# Patient Record
Sex: MALE | Race: WHITE | NOT HISPANIC OR LATINO | Employment: FULL TIME | URBAN - METROPOLITAN AREA
[De-identification: names, ages, dates, MRNs, and addresses within clinical notes are randomized per-mention and may not be internally consistent; named-entity substitution may affect disease eponyms.]

---

## 2018-02-06 ENCOUNTER — HOSPITAL ENCOUNTER (OUTPATIENT)
Facility: HOSPITAL | Age: 38
Setting detail: OBSERVATION
Discharge: HOME | End: 2018-02-06
Attending: EMERGENCY MEDICINE | Admitting: INTERNAL MEDICINE
Payer: COMMERCIAL

## 2018-02-06 VITALS
DIASTOLIC BLOOD PRESSURE: 70 MMHG | TEMPERATURE: 98.3 F | HEART RATE: 88 BPM | OXYGEN SATURATION: 94 % | SYSTOLIC BLOOD PRESSURE: 133 MMHG | WEIGHT: 180 LBS | RESPIRATION RATE: 16 BRPM | HEIGHT: 71 IN | BODY MASS INDEX: 25.2 KG/M2

## 2018-02-06 DIAGNOSIS — F10.929 ACUTE ALCOHOL INTOXICATION (HCC): Primary | ICD-10-CM

## 2018-02-06 LAB
ALBUMIN SERPL BCP-MCNC: 3.5 G/DL (ref 3.5–5)
ALP SERPL-CCNC: 83 U/L (ref 46–116)
ALT SERPL W P-5'-P-CCNC: 84 U/L (ref 12–78)
ANION GAP SERPL CALCULATED.3IONS-SCNC: 10 MMOL/L (ref 4–13)
AST SERPL W P-5'-P-CCNC: 37 U/L (ref 5–45)
BASOPHILS # BLD AUTO: 0.02 THOUSANDS/ΜL (ref 0–0.1)
BASOPHILS NFR BLD AUTO: 0 % (ref 0–1)
BILIRUB SERPL-MCNC: 0.17 MG/DL (ref 0.2–1)
BUN SERPL-MCNC: 8 MG/DL (ref 5–25)
CALCIUM SERPL-MCNC: 8.5 MG/DL (ref 8.3–10.1)
CHLORIDE SERPL-SCNC: 108 MMOL/L (ref 100–108)
CO2 SERPL-SCNC: 27 MMOL/L (ref 21–32)
CREAT SERPL-MCNC: 0.77 MG/DL (ref 0.6–1.3)
EOSINOPHIL # BLD AUTO: 0.11 THOUSAND/ΜL (ref 0–0.61)
EOSINOPHIL NFR BLD AUTO: 2 % (ref 0–6)
ERYTHROCYTE [DISTWIDTH] IN BLOOD BY AUTOMATED COUNT: 12.4 % (ref 11.6–15.1)
ETHANOL SERPL-MCNC: 336 MG/DL (ref 0–3)
GFR SERPL CREATININE-BSD FRML MDRD: 114 ML/MIN/1.73SQ M
GLUCOSE SERPL-MCNC: 123 MG/DL (ref 65–140)
HCT VFR BLD AUTO: 46.5 % (ref 36.5–49.3)
HGB BLD-MCNC: 15.8 G/DL (ref 12–17)
LYMPHOCYTES # BLD AUTO: 2.83 THOUSANDS/ΜL (ref 0.6–4.47)
LYMPHOCYTES NFR BLD AUTO: 40 % (ref 14–44)
MCH RBC QN AUTO: 32.6 PG (ref 26.8–34.3)
MCHC RBC AUTO-ENTMCNC: 34 G/DL (ref 31.4–37.4)
MCV RBC AUTO: 96 FL (ref 82–98)
MONOCYTES # BLD AUTO: 0.41 THOUSAND/ΜL (ref 0.17–1.22)
MONOCYTES NFR BLD AUTO: 6 % (ref 4–12)
NEUTROPHILS # BLD AUTO: 3.73 THOUSANDS/ΜL (ref 1.85–7.62)
NEUTS SEG NFR BLD AUTO: 52 % (ref 43–75)
NRBC BLD AUTO-RTO: 0 /100 WBCS
PLATELET # BLD AUTO: 272 THOUSANDS/UL (ref 149–390)
PMV BLD AUTO: 10.4 FL (ref 8.9–12.7)
POTASSIUM SERPL-SCNC: 3.8 MMOL/L (ref 3.5–5.3)
PROT SERPL-MCNC: 8.1 G/DL (ref 6.4–8.2)
RBC # BLD AUTO: 4.84 MILLION/UL (ref 3.88–5.62)
SODIUM SERPL-SCNC: 145 MMOL/L (ref 136–145)
WBC # BLD AUTO: 7.12 THOUSAND/UL (ref 4.31–10.16)

## 2018-02-06 PROCEDURE — 99285 EMERGENCY DEPT VISIT HI MDM: CPT

## 2018-02-06 PROCEDURE — 36415 COLL VENOUS BLD VENIPUNCTURE: CPT | Performed by: EMERGENCY MEDICINE

## 2018-02-06 PROCEDURE — 80320 DRUG SCREEN QUANTALCOHOLS: CPT | Performed by: EMERGENCY MEDICINE

## 2018-02-06 PROCEDURE — 85025 COMPLETE CBC W/AUTO DIFF WBC: CPT | Performed by: EMERGENCY MEDICINE

## 2018-02-06 PROCEDURE — 96374 THER/PROPH/DIAG INJ IV PUSH: CPT

## 2018-02-06 PROCEDURE — 80053 COMPREHEN METABOLIC PANEL: CPT | Performed by: EMERGENCY MEDICINE

## 2018-02-06 RX ORDER — SODIUM CHLORIDE 9 MG/ML
125 INJECTION, SOLUTION INTRAVENOUS CONTINUOUS
Status: DISCONTINUED | OUTPATIENT
Start: 2018-02-06 | End: 2018-02-06 | Stop reason: HOSPADM

## 2018-02-06 RX ADMIN — SODIUM CHLORIDE 1000 ML: 0.9 INJECTION, SOLUTION INTRAVENOUS at 02:05

## 2018-02-06 NOTE — PROGRESS NOTES
[facility] Senior Admission Note   Unit/Bed # @DBLINK (ALEC,39397)@ Encounter: [id number]  SOD Team B           Walt Cachorro 42 y o  male [id number]       Patient seen and examined  Reviewed H&P per Dr Pascale Baxter  Agree with the assessment and plan except NA  Assessment/Plan: Principal Problem:    Alcohol intoxication [facility])   briefly, this is a 49-year-old male with unknown past medical history who presents for alcohol intoxication after he was found at [employer]  He resides in [facility] but is here in [place] for a family member's  that is scheduled for tomorrow  Patient reports that he recently went through detox for alcohol  He normally drinks wine and no other types of alcohol  He denies any other type of drug use  He notes that he was drinking wine this evening but cannot quantify how much  Patient has no complaints and denies any headache, chest pain, nausea, vomiting or urinary symptoms  He notes that he was recently started on blood thinner but cannot tell us why this was initiated or what the medication is specifically  In the ED blood alcohol level was 336  Patient notes that he had then admitted to the hospital once before for alcohol withdrawal   He denies any history of seizures      Assessment and plan:  Acute alcohol intoxication with history of alcohol abuse status post detox 1-2 weeks ago   -admit for observation  -IV thiamine and folate  -IV fluids normal saline  -consult case management  -keep level 1 full code    Disposition:  OBSERVATION    Expected LOS: <2 28 [facility],

## 2018-02-06 NOTE — ED NOTES
PER PT 'I CAME DOWN TO THIS AREA FROM [place], FOR A FAMILY FINERAL TOMORROW, I HAD A FREE ROOM AT THE [facility], SO I WENT THERE, I STARTED PLAYING CARDS, AND STARTED TO DRINK (WINE IS HIS PASSION), THEY WOULDN'T LET ME GO TO MY ROOM AND GO TO SLEEP, THEY CALLED THE AMBULANCE, AND BROUGHT ME HERE, I DID NOT FALL, I HAVE NO INJURIES, I AM FINE"  PER EMS "WAS IN REHAB FOR ALCOHOL, WIFE TRIED CALLING MULTIPLE TIMES, PT DIDN'T ANSWER, SHE CALLED SECURITY, AND PT FOUND INTOXICATED, SECURITY CALLED EMS"     Nathan Lowry, RN  02/06/18 0148       Nathan Lowry RN  02/06/18 2494

## 2018-02-06 NOTE — ED ATTENDING ATTESTATION
I, [address] [place] [address], DO, saw and evaluated the patient  I have discussed the patient with the resident/non-physician practitioner and agree with the resident's/non-physician practitioner's findings, Plan of Care, and MDM as documented in the resident's/non-physician practitioner's note, except where noted  All available labs and Radiology studies were reviewed  At this point I agree with the current assessment done in the Emergency Department  I have conducted an independent evaluation of this patient a history and physical is as follows:    42yo male presents with alcohol intoxication  Pt was at [facility]  and wife could not reach him so contacted security who found him intoxicated  No known trauma  No complaints but unable to give reliable history secondary to intoxication  On exam - nad, appears intoxicated, no obvious sign of trauma, heart tachy, no resp distress    Plan - check etoh consider admit for intoxication vs observe until sober    Critical Care Time  CritCare Time    Procedures

## 2018-02-06 NOTE — H&P
INTERNAL MEDICINE HISTORY AND PHYSICAL  ED 14 SOD Team B     NAME: Tracey Yee  AGE: 42 y o  SEX: male  : 1980   MRN: [id number]  ENCOUNTER: [id number]    DATE: 2018  TIME: 3:17 AM    Primary Care Physician: No primary care provider on file  Admitting Provider: Sushma Trinidad MD    Chief complaint:  Patient's girlfriend called the police because she could not get in contact with him  They found him at [facility] and brought him into the hospital       History of Present Illness     Tracey Yee is a 42 y o  male with a history of alcoholism, alcohol withdrawal 1 time in the past (denies hallucinations, denies seizures) was found intoxicated at since could see no after the girlfriend called the police because she cannot get in contact with him  Patient is from [place], [place] and was in the area to attend a  on  for an uncle  Patient denies any trauma or falls  Patient denies any medical history  Patient denies chest pain, headache, nausea, vomiting, difficulty urinating, difficulty stooling, abdominal pain or any shortness of breath  Patient denies any illicit drug usage or any tobacco usage  Patient reportedly takes blood thinner" for an unclear etiology recently prescribed to him by a physician whom he does not know the name of  Patient was recently in detox for alcohol consumption predominantly wine as per him  It is unclear how much the patient drinks on a regular basis prior to his detox  The patient does not want family members to know that he is in the hospital     In the Ed:  - 1 L normal saline  CBC = largely within normal limits  CMP = ALT = 84, total bilirubin 0 17  Ethanol level = 336    Review of Systems   Review of Systems   Constitutional: Negative for appetite change, chills, fatigue and fever  HENT: Negative  Negative for sore throat  Eyes: Negative for photophobia and visual disturbance     Respiratory: Negative for chest tightness, shortness of breath, wheezing and stridor  Cardiovascular: Negative for chest pain and leg swelling  Gastrointestinal: Negative for blood in stool, constipation, diarrhea and nausea  Musculoskeletal: Negative for back pain, neck pain and neck stiffness  Skin: Negative for pallor and rash  Neurological: Negative for tremors, syncope, light-headedness, numbness and headaches  All other systems reviewed and are negative  Past Medical History     Past Medical History:   Diagnosis Date    Hypertension        Past Surgical History   History reviewed  No pertinent surgical history  Social History     History   Alcohol Use    Yes     History   Drug Use No     History   Smoking Status    Former Smoker   Smokeless Tobacco    Never Used       Family History   History reviewed  No pertinent family history  Medications Prior to Admission     Prior to Admission medications    Not on File       Allergies   No Known Allergies    Objective     Vitals:    02/06/18 0133 02/06/18 0139 02/06/18 0145 02/06/18 0300   BP: 144/81 144/81 144/81    Pulse: 100 100 102 96   Resp: 16      Temp: 98 3 °F (36 8 °C)      TempSrc: Oral      SpO2: 96%  94% 96%   Weight: 81 6 kg (180 lb)      Height: 5' 11" (1 803 m)        Body mass index is 25 1 kg/m²  Intake/Output Summary (Last 24 hours) at 02/06/18 0317  Last data filed at 02/06/18 0316   Gross per 24 hour   Intake             1000 ml   Output                0 ml   Net             1000 ml     Invasive Devices     Peripheral Intravenous Line            Peripheral IV 02/06/18 Left Antecubital less than 1 day                Physical Exam  Physical Exam   Constitutional: He is oriented to person, place, and time  He appears well-developed and well-nourished  HENT:   Head: Normocephalic and atraumatic  Eyes: EOM are normal  Pupils are equal, round, and reactive to light  Cardiovascular: Normal rate, regular rhythm and normal heart sounds      Pulmonary/Chest: Effort normal and breath sounds normal  He has no wheezes  He has no rales  Abdominal: Bowel sounds are normal  There is no tenderness  There is no rebound  Musculoskeletal: Normal range of motion  He exhibits no edema  Neurological: He is alert and oriented to person, place, and time  Skin: Skin is warm and dry  No rash noted  Psychiatric:   Intoxicated  Sleepy  But responds appropriately  Vitals reviewed  Lab Results: I have personally reviewed pertinent reports  CBC:   Results from last 7 days  Lab Units 02/06/18  0204   WBC Thousand/uL 7 12   RBC Million/uL 4 84   HEMOGLOBIN g/dL 15 8   HEMATOCRIT % 46 5   MCV fL 96   MCH pg 32 6   MCHC g/dL 34 0   RDW % 12 4   MPV fL 10 4   PLATELETS Thousands/uL 272   NRBC AUTO /100 WBCs 0   NEUTROS PCT % 52   LYMPHS PCT % 40   MONOS PCT % 6   EOS PCT % 2   BASOS PCT % 0   NEUTROS ABS Thousands/µL 3 73   LYMPHS ABS Thousands/µL 2 83   MONOS ABS Thousand/µL 0 41   EOS ABS Thousand/µL 0 11   , Chemistry Profile:   Results from last 7 days  Lab Units 02/06/18  0204   SODIUM mmol/L 145   POTASSIUM mmol/L 3 8   CHLORIDE mmol/L 108   CO2 mmol/L 27   ANION GAP mmol/L 10   BUN mg/dL 8   CREATININE mg/dL 0 77   GLUCOSE RANDOM mg/dL 123   CALCIUM mg/dL 8 5   AST U/L 37   ALT U/L 84*   ALK PHOS U/L 83   TOTAL PROTEIN g/dL 8 1   BILIRUBIN TOTAL mg/dL 0 17*   EGFR ml/min/1 73sq m 114   , Coagulation Studies:   , Cardiac Studies:   , Additional Labs:   , iSTAT CHEM 8:   Results from last 7 days  Lab Units 02/06/18  0204   EGFR ml/min/1 73sq m 114   GLUCOSE RANDOM mg/dL 123   HEMOGLOBIN g/dL 15 8   , ABG:   , Toxicology:   Results from last 7 days  Lab Units 02/06/18  0204   ETHANOL LVL mg/dL 336*   , Last A1C/Lipid Panel/Thyroid Panel: No results found for: HGBA1C, TRIG, CHOL, HDL, LDLCALC, GVL2QQSYPGCD, T3FREE, K2UZBDD, FREET4    Imaging: I have personally reviewed pertinent films in PACS  No results found      Urinalysis:       Invalid input(s): URIBILINOGEN     Urine Micro: EKG, Pathology, and Other Studies: I have personally reviewed pertinent reports  Medications given in Emergency Department     Medication Administration - last 24 hours from 02/05/2018 0317 to 02/06/2018 0862       Date/Time Order Dose Route Action Action by     02/06/2018 0316 sodium chloride 0 9 % bolus 1,000 mL 0 mL Intravenous Stopped Marta Morrison, RN     02/06/2018 0205 sodium chloride 0 9 % bolus 1,000 mL 1,000 mL Intravenous New Bag Jessee Lewis RN          Assessment and Plan     Patient Active Problem List   Diagnosis    Alcohol intoxication ([facility] [address] )     Alcohol intoxication - alcohol level 336  Given folic acid and thiamine  1l ns bolus  Cbc, cmp largely wnl  Brought in by ambulance found to be [facility]  Patient drank wine only there  Doesn't know how much  Has history of alcoholism and alcohol withdrawal 1x   - current ciwa 0  Monitor for withdrwal  - trend alcohol level in am  - f/u mg level  - f/u phosphorus level  - f/u PT/INR - given "blood thinners"  - c/w folic acid 1mg daily  - c/w thiamine 100mg daily  - c/w multivitamin PO  - consult case management  - c/w NS fluids at 125cc, can d/c in am when patient has intake    Code Status: Level 1 - Full Code  VTE Pharmacologic Prophylaxis: Enoxaparin (Lovenox)   VTE Mechanical Prophylaxis: sequential compression device  Admission Status: OBSERVATION     Admission Time  I spent 30 minutes admitting the patient  This involved direct patient contact where I performed a full history and physical, reviewing previous records, and reviewing laboratory and other diagnostic studies      Adrian Chaudhari MD  Internal Medicine  PGY-1

## 2018-02-06 NOTE — DISCHARGE SUMMARY
[facility] CENTRAL Discharge Summary - Medical Devi Mei 42 y o  male MRN: [id number]    [address] [facility] ED Room / Bed: ED 14/ED 14 Encounter: [id number]    BRIEF OVERVIEW    Admitting Provider: Luzmaria Resendez MD  Discharge Provider: Solo Acevedo DO  Primary Care Physician at Discharge: No PCP    Discharge To: Home  Facility / Family Member Name: [facility]  Phone Number: [phone]    Admission Date: 2018     Discharge Date: 18    Primary Discharge Diagnosis  Principal Problem:    Alcohol intoxication ([address] )  Resolved Problems:    * No resolved hospital problems  *      Other Problems Addressed: none    Consulting Providers   none        Therapeutic Operative Procedures Performed  none    Diagnostic Procedures Performed  none    Discharge Disposition: Final discharge disposition not confirmed  Discharged With Lines: no    Test Results Pending at Discharge: none    Outpatient Follow-Up  none required  Follow up with consulting providers  none required   Active Issues Requiring Follow-up   none    Code Status: Level 1 - Full Code    Medications   No medications on file  Patient reports that he takes blood thinner but does not know the name  Allergies  No Known Allergies  Discharge Diet: regular diet  Activity restrictions: none    [facility] Course  Mr Maria De Jesus Alcala is a 57-year-old male with history of alcoholism who presents with acute alcohol intoxication from the [facility]  He resides in [place] and came to [place] for his uncle's  which was scheduled for the following day  Patient does not exactly recall what happened but notes that he [place] and started drinking [place]  He denies any falls, trauma, nausea, vomiting or abdominal pain  He denies any other drug use    His wife reportedly called police when she could not reach him and he was found in the [place] acutely intoxicated  He was brought to [facility] where his blood alcohol level was 336  Of note the patient reports that he had recently been through alcohol detox  He notes that he would drink 1 but could not quantify how much on a daily basis  He also notes that he was recently started on a blood thinner but he does not know why or what it is called  The patient was admitted for observation  Short time later his significant other arrived at the hospital and was able to take him home  The patient was discharged to the care of his significant other on 02/06/2018  Presenting Problem/History of Present Illness  Principal Problem:    Alcohol intoxication ([address] )  Resolved Problems:    * No resolved hospital problems  *        Other Pertinent Test Results  none    Discharge Condition: stable      Discharge  Statement   I spent 30 minutes minutes discharging the patient  This time was spent on the day of discharge  I had direct contact with the patient on the day of discharge  Additional documentation is required if more than 30 minutes were spent on discharge

## 2018-02-06 NOTE — ED PROVIDER NOTES
History  Chief Complaint   Patient presents with    Alcohol Intoxication     70-year-old male presenting to the ER today with acute alcohol intoxication  Patient was found intoxicated on the floor the [facility]  Patient was in the area for a  tomorrow  States that his uncle recently   Patient's wife became concerned when she did not hear from him and called the [facility] staff to palpation on the floor this jamel  Was brought to the ER for further evaluation and treatment  History provided by:  Patient   used: No    Alcohol Intoxication   Similar prior episodes: yes    Severity:  Moderate  Onset quality:  Gradual  Timing:  Constant  Progression:  Worsening  Chronicity:  Recurrent  Suspected agents:  Alcohol  Associated symptoms: no abdominal pain, no nausea and no vomiting        None       Past Medical History:   Diagnosis Date    Alcoholism ([facility] [address] )     Hypertension        Past Surgical History:   Procedure Laterality Date    SHOULDER SURGERY         Family History   Problem Relation Age of Onset    Family history unknown: Yes     I have reviewed and agree with the history as documented  Social History   Substance Use Topics    Smoking status: Former Smoker    Smokeless tobacco: Never Used    Alcohol use Yes        Review of Systems   Constitutional: Negative for activity change, appetite change, chills, fatigue and fever  HENT: Negative  Eyes: Negative  Respiratory: Negative  Cardiovascular: Negative  Gastrointestinal: Negative for abdominal distention, abdominal pain, blood in stool, constipation, diarrhea, nausea and vomiting  Endocrine: Negative  Genitourinary: Negative for decreased urine volume, difficulty urinating, dysuria, enuresis, flank pain, frequency, hematuria, penile swelling, scrotal swelling, testicular pain and urgency  Musculoskeletal: Negative  Skin: Negative  Allergic/Immunologic: Negative      Neurological: Negative  Hematological: Negative  Psychiatric/Behavioral: Negative  All other systems reviewed and are negative  Physical Exam  ED Triage Vitals [02/06/18 0133]   Temperature Pulse Respirations Blood Pressure SpO2   98 3 °F (36 8 °C) 100 16 144/81 96 %      Temp Source Heart Rate Source Patient Position - Orthostatic VS BP Location FiO2 (%)   Oral Monitor -- -- --      Pain Score       No Pain           Orthostatic Vital Signs  Vitals:    02/06/18 0315 02/06/18 0330 02/06/18 0400 02/06/18 0500   BP:   133/70    Pulse: 96 92 92 88       Physical Exam   Constitutional: He appears well-developed and well-nourished  HENT:   Head: Normocephalic and atraumatic  Right Ear: External ear normal    Left Ear: External ear normal    Nose: Nose normal    Mouth/Throat: Oropharynx is clear and moist    Eyes: Conjunctivae and EOM are normal  Pupils are equal, round, and reactive to light  Neck: Normal range of motion  Neck supple  No JVD present  No tracheal deviation present  No thyromegaly present  Cardiovascular: Normal rate, regular rhythm, normal heart sounds and intact distal pulses  Pulmonary/Chest: Effort normal and breath sounds normal  No stridor  No respiratory distress  He has no wheezes  He has no rales  He exhibits no tenderness  Abdominal: Soft  Bowel sounds are normal  He exhibits no distension and no mass  There is no tenderness  There is no rebound and no guarding  No hernia  Musculoskeletal: Normal range of motion  He exhibits no edema, tenderness or deformity  Lymphadenopathy:     He has no cervical adenopathy  Neurological: He is alert  He has normal reflexes  Skin: Skin is warm  No rash noted  No erythema  No pallor  Psychiatric: His speech is slurred  Cognition and memory are impaired  He exhibits a depressed mood  Clinically intoxicated on exam   He is inattentive  Nursing note and vitals reviewed        ED Medications  Medications   enoxaparin (LOVENOX) subcutaneous injection 40 mg (not administered)   sodium chloride 0 9 % infusion (not administered)   multivitamin-minerals (CENTRUM) tablet 1 tablet (not administered)   folic acid 1 mg, thiamine (VITAMIN B1) 100 mg in sodium chloride 0 9 % 50 mL IVPB (not administered)   sodium chloride 0 9 % bolus 1,000 mL (0 mL Intravenous Stopped 2/6/18 0316)       Diagnostic Studies  Results Reviewed     Procedure Component Value Units Date/Time    Magnesium [[id number]]     Lab Status:  No result Specimen:  Blood     Phosphorus [[id number]]     Lab Status:  No result Specimen:  Blood     Platelet count [[id number]]     Lab Status:  No result Specimen:  Blood     Ethanol [[id number]]  (Abnormal) Collected:  02/06/18 0204    Lab Status:  Final result Specimen:  Blood from Arm, Left Updated:  02/06/18 0236     Ethanol Lvl 336 (H) mg/dL     Comprehensive metabolic panel [[id number]]  (Abnormal) Collected:  02/06/18 0204    Lab Status:  Final result Specimen:  Blood from Arm, Left Updated:  02/06/18 8082     Sodium 145 mmol/L      Potassium 3 8 mmol/L      Chloride 108 mmol/L      CO2 27 mmol/L      Anion Gap 10 mmol/L      BUN 8 mg/dL      Creatinine 0 77 mg/dL      Glucose 123 mg/dL      Calcium 8 5 mg/dL      AST 37 U/L      ALT 84 (H) U/L      Alkaline Phosphatase 83 U/L      Total Protein 8 1 g/dL      Albumin 3 5 g/dL      Total Bilirubin 0 17 (L) mg/dL      eGFR 114 ml/min/1 73sq m     Narrative:         National Kidney Disease Education Program recommendations are as follows:  GFR calculation is accurate only with a steady state creatinine  Chronic Kidney disease less than 60 ml/min/1 73 sq  meters  Kidney failure less than 15 ml/min/1 73 sq  meters      CBC and differential [[id number]]  (Normal) Collected:  02/06/18 0204    Lab Status:  Final result Specimen:  Blood from Arm, Left Updated:  02/06/18 0213     WBC 7 12 Thousand/uL      RBC 4 84 Million/uL      Hemoglobin 15 8 g/dL      Hematocrit 46 5 %      MCV 96 fL      MCH 32 6 pg      MCHC 34 0 g/dL      RDW 12 4 %      MPV 10 4 fL      Platelets 509 Thousands/uL      nRBC 0 /100 WBCs      Neutrophils Relative 52 %      Lymphocytes Relative 40 %      Monocytes Relative 6 %      Eosinophils Relative 2 %      Basophils Relative 0 %      Neutrophils Absolute 3 73 Thousands/µL      Lymphocytes Absolute 2 83 Thousands/µL      Monocytes Absolute 0 41 Thousand/µL      Eosinophils Absolute 0 11 Thousand/µL      Basophils Absolute 0 02 Thousands/µL                  No orders to display         Procedures  Procedures      Phone Consults  ED Phone Contact    ED Course  ED Course as of Feb 06 0625 Tue Feb 06, 2018   0411  Patient's wife is at bedside  She states patient has been having difficulties with alcohol for the past month  States he drinks 4 bottles of wine a day  States he has had a stent in rehab but relapsed  MDM  Number of Diagnoses or Management Options  Acute alcohol intoxication [facility]): new and requires workup  Diagnosis management comments: Patient will be admitted to the internal medicine service given etoh greater than 300          Amount and/or Complexity of Data Reviewed  Clinical lab tests: ordered and reviewed  Tests in the radiology section of CPT®: ordered and reviewed  Tests in the medicine section of CPT®: reviewed and ordered  Decide to obtain previous medical records or to obtain history from someone other than the patient: yes  Independent visualization of images, tracings, or specimens: yes    Patient Progress  Patient progress: stable    CritCare Time    Disposition  Final diagnoses:   Acute alcohol intoxication ([address] )     Time reflects when diagnosis was documented in both MDM as applicable and the Disposition within this note     Time User Action Codes Description Comment    2/6/2018  2:55 AM Zheng Cao Add [U30 395] Acute alcohol intoxication [facility])       ED Disposition     ED Disposition Condition Comment    Admit  Case was discussed with Randi and the patient's admission status was agreed to be Admission Status: observation status to the service of Dr Quynh Tenorio   Follow-up Information    None       There are no discharge medications for this patient  No discharge procedures on file  ED Provider  Attending physically available and evaluated Remington Sahu I managed the patient along with the ED Attending      Electronically Signed by         Zenaida Thornton DO  02/06/18 9312

## 2018-04-05 NOTE — ED NOTES
SPOKE TO sod RESIDENT, "PT CAN GO HOME WITH SPOUSE"  DISCHARGED TO CARE OF SPOUSE     Concha Cardona RN  02/06/18 6112 Tolerated all exercise well.